# Patient Record
Sex: MALE | NOT HISPANIC OR LATINO | URBAN - METROPOLITAN AREA
[De-identification: names, ages, dates, MRNs, and addresses within clinical notes are randomized per-mention and may not be internally consistent; named-entity substitution may affect disease eponyms.]

---

## 2019-08-16 ENCOUNTER — IMPORTED ENCOUNTER (OUTPATIENT)
Dept: URBAN - METROPOLITAN AREA CLINIC 1 | Facility: CLINIC | Age: 74
End: 2019-08-16

## 2019-08-16 PROBLEM — H25.813: Noted: 2019-08-16

## 2019-08-16 PROBLEM — H35.363: Noted: 2019-08-16

## 2019-08-16 PROCEDURE — 92015 DETERMINE REFRACTIVE STATE: CPT

## 2019-08-16 PROCEDURE — 92004 COMPRE OPH EXAM NEW PT 1/>: CPT

## 2019-08-16 NOTE — PATIENT DISCUSSION
1.  Cataract OU: Observe for now without intervention. The patient was advised to contact us if any change or worsening of vision2. Retinal Drusen OU: Observe  MRX for glasses given. Return for an appointment in 1 year 27 with Dr. Malena James.

## 2022-04-02 ASSESSMENT — VISUAL ACUITY
OS_GLARE: 20/400
OS_CC: J1
OD_GLARE: 20/400
OS_SC: 20/20
OD_SC: 20/20
OD_CC: J1

## 2022-04-02 ASSESSMENT — TONOMETRY
OD_IOP_MMHG: 14
OS_IOP_MMHG: 12

## 2023-02-02 RX ORDER — METHOCARBAMOL 500 MG/1
500 TABLET, FILM COATED ORAL 3 TIMES DAILY
COMMUNITY
Start: 2022-01-12

## 2023-02-02 RX ORDER — HYDROCHLOROTHIAZIDE 12.5 MG/1
12.5 TABLET ORAL DAILY
COMMUNITY

## 2023-02-02 RX ORDER — LISINOPRIL 10 MG/1
10 TABLET ORAL DAILY
COMMUNITY